# Patient Record
Sex: MALE | Race: WHITE
[De-identification: names, ages, dates, MRNs, and addresses within clinical notes are randomized per-mention and may not be internally consistent; named-entity substitution may affect disease eponyms.]

---

## 2017-05-22 ENCOUNTER — HOSPITAL ENCOUNTER (EMERGENCY)
Dept: HOSPITAL 79 - ER1 | Age: 67
Discharge: HOME | End: 2017-05-22
Payer: COMMERCIAL

## 2017-05-22 DIAGNOSIS — Z88.2: ICD-10-CM

## 2017-05-22 DIAGNOSIS — I48.91: ICD-10-CM

## 2017-05-22 DIAGNOSIS — R00.1: Primary | ICD-10-CM

## 2017-05-22 DIAGNOSIS — Z79.02: ICD-10-CM

## 2017-05-22 DIAGNOSIS — Z79.899: ICD-10-CM

## 2017-05-22 DIAGNOSIS — Z79.01: ICD-10-CM

## 2017-05-22 DIAGNOSIS — I25.2: ICD-10-CM

## 2017-05-22 LAB
BUN/CREATININE RATIO: 20 (ref 0–10)
HGB BLD-MCNC: 14.3 GM/DL (ref 14–17.5)
RED BLOOD COUNT: 4.53 M/UL (ref 4.2–5.5)
WHITE BLOOD COUNT: 7.4 K/UL (ref 4.5–11)

## 2021-03-11 ENCOUNTER — IMMUNIZATION (OUTPATIENT)
Dept: VACCINE CLINIC | Facility: HOSPITAL | Age: 71
End: 2021-03-11

## 2021-03-11 PROCEDURE — 91300 HC SARSCOV02 VAC 30MCG/0.3ML IM: CPT | Performed by: INTERNAL MEDICINE

## 2021-03-11 PROCEDURE — 0001A: CPT | Performed by: INTERNAL MEDICINE

## 2021-04-01 ENCOUNTER — IMMUNIZATION (OUTPATIENT)
Dept: VACCINE CLINIC | Facility: HOSPITAL | Age: 71
End: 2021-04-01

## 2021-04-01 PROCEDURE — 91300 HC SARSCOV02 VAC 30MCG/0.3ML IM: CPT | Performed by: INTERNAL MEDICINE

## 2021-04-01 PROCEDURE — 0002A: CPT | Performed by: INTERNAL MEDICINE

## 2021-05-21 ENCOUNTER — HOSPITAL ENCOUNTER (OUTPATIENT)
Dept: GENERAL RADIOLOGY | Facility: HOSPITAL | Age: 71
Discharge: HOME | End: 2021-05-21
Admitting: FAMILY MEDICINE

## 2021-05-21 ENCOUNTER — TRANSCRIBE ORDERS (OUTPATIENT)
Dept: LAB | Facility: HOSPITAL | Age: 71
End: 2021-05-21

## 2021-05-21 DIAGNOSIS — M25.571 RIGHT ANKLE PAIN, UNSPECIFIED CHRONICITY: ICD-10-CM

## 2021-05-21 DIAGNOSIS — M25.571 RIGHT ANKLE PAIN, UNSPECIFIED CHRONICITY: Primary | ICD-10-CM

## 2021-05-21 PROCEDURE — 73610 X-RAY EXAM OF ANKLE: CPT | Performed by: RADIOLOGY

## 2021-05-21 PROCEDURE — 73610 X-RAY EXAM OF ANKLE: CPT

## 2021-05-25 ENCOUNTER — HOSPITAL ENCOUNTER (OUTPATIENT)
Dept: GENERAL RADIOLOGY | Facility: HOSPITAL | Age: 71
Discharge: HOME | End: 2021-05-25
Admitting: FAMILY MEDICINE

## 2021-05-25 ENCOUNTER — TRANSCRIBE ORDERS (OUTPATIENT)
Dept: LAB | Facility: HOSPITAL | Age: 71
End: 2021-05-25

## 2021-05-25 DIAGNOSIS — M79.604 PAIN IN RIGHT LEG: ICD-10-CM

## 2021-05-25 DIAGNOSIS — M79.604 PAIN IN RIGHT LEG: Primary | ICD-10-CM

## 2021-05-25 PROCEDURE — 72110 X-RAY EXAM L-2 SPINE 4/>VWS: CPT | Performed by: RADIOLOGY

## 2021-05-25 PROCEDURE — 72110 X-RAY EXAM L-2 SPINE 4/>VWS: CPT

## 2022-11-08 ENCOUNTER — OFFICE VISIT (OUTPATIENT)
Dept: CARDIOLOGY | Facility: CLINIC | Age: 72
End: 2022-11-08

## 2022-11-08 VITALS
DIASTOLIC BLOOD PRESSURE: 74 MMHG | HEIGHT: 65 IN | BODY MASS INDEX: 25.02 KG/M2 | SYSTOLIC BLOOD PRESSURE: 123 MMHG | HEART RATE: 56 BPM | WEIGHT: 150.2 LBS | RESPIRATION RATE: 16 BRPM

## 2022-11-08 DIAGNOSIS — I25.10 CORONARY ARTERY DISEASE INVOLVING NATIVE CORONARY ARTERY OF NATIVE HEART WITHOUT ANGINA PECTORIS: Primary | ICD-10-CM

## 2022-11-08 DIAGNOSIS — E78.5 DYSLIPIDEMIA: ICD-10-CM

## 2022-11-08 DIAGNOSIS — F17.211 CIGARETTE NICOTINE DEPENDENCE IN REMISSION: ICD-10-CM

## 2022-11-08 DIAGNOSIS — R01.1 HEART MURMUR: ICD-10-CM

## 2022-11-08 DIAGNOSIS — I48.0 PAROXYSMAL ATRIAL FIBRILLATION: ICD-10-CM

## 2022-11-08 DIAGNOSIS — I10 ESSENTIAL HYPERTENSION: ICD-10-CM

## 2022-11-08 DIAGNOSIS — G47.33 OSA (OBSTRUCTIVE SLEEP APNEA): ICD-10-CM

## 2022-11-08 DIAGNOSIS — Z95.1 S/P CABG (CORONARY ARTERY BYPASS GRAFT): ICD-10-CM

## 2022-11-08 PROCEDURE — 99204 OFFICE O/P NEW MOD 45 MIN: CPT | Performed by: SPECIALIST

## 2022-11-08 PROCEDURE — 93000 ELECTROCARDIOGRAM COMPLETE: CPT | Performed by: SPECIALIST

## 2022-11-08 RX ORDER — TAMSULOSIN HYDROCHLORIDE 0.4 MG/1
1 CAPSULE ORAL DAILY
COMMUNITY

## 2022-11-08 RX ORDER — TRAZODONE HYDROCHLORIDE 50 MG/1
50 TABLET ORAL NIGHTLY
COMMUNITY
End: 2023-01-24

## 2022-11-08 RX ORDER — CLOPIDOGREL BISULFATE 75 MG/1
75 TABLET ORAL DAILY
COMMUNITY

## 2022-11-08 RX ORDER — HYDROXYZINE PAMOATE 25 MG/1
25 CAPSULE ORAL 2 TIMES DAILY
COMMUNITY

## 2022-11-08 RX ORDER — ATORVASTATIN CALCIUM 80 MG/1
80 TABLET, FILM COATED ORAL DAILY
COMMUNITY
End: 2023-01-24

## 2022-11-08 RX ORDER — ASPIRIN 81 MG/1
81 TABLET ORAL DAILY
COMMUNITY

## 2022-11-08 NOTE — PROGRESS NOTES
Subjective   Initial consultation, CAD, S/P CABG* 2  Valentin Ward is a 72 y.o. male who presents to day for Establish Care (Prior patient of Dr. Early), Coronary Artery Disease (S/p  x2V bypass, [facility] 03/2020), Med Management (List provided), and leg cramps (At night).    CHIEF COMPLIANT  Chief Complaint   Patient presents with   • Establish Care     Prior patient of Dr. Early   • Coronary Artery Disease     S/p  x2V bypass,  hosp 03/2020   • Med Management     List provided   • leg cramps     At night       Active Problems:  Problem List Items Addressed This Visit        Cardiac and Vasculature    Coronary artery disease involving native coronary artery of native heart without angina pectoris - Primary    Relevant Medications    clopidogrel (PLAVIX) 75 MG tablet    metoprolol tartrate (LOPRESSOR) 25 MG tablet    S/P CABG (coronary artery bypass graft)    Essential hypertension    Relevant Medications    metoprolol tartrate (LOPRESSOR) 25 MG tablet    Other Relevant Orders    Comprehensive Metabolic Panel    Dyslipidemia    Relevant Orders    Lipid Panel    Paroxysmal atrial fibrillation (HCC)    Relevant Medications    clopidogrel (PLAVIX) 75 MG tablet    metoprolol tartrate (LOPRESSOR) 25 MG tablet    Other Relevant Orders    Adult Transthoracic Echo Complete w/ Color, Spectral and Contrast if necessary per protocol    Cardiac Event Monitor    Heart murmur    Relevant Orders    Adult Transthoracic Echo Complete w/ Color, Spectral and Contrast if necessary per protocol       Sleep    MARIA (obstructive sleep apnea)    Relevant Orders    Ambulatory Referral to Pulmonology       Tobacco    Cigarette nicotine dependence in remission       HPI  HPI  Was found to have left main disease back in March 2020 where he underwent coronary artery with vascular stents x2 at [facility] with a LIMA graft to LAD and SVG graft to the obtuse marginal 2 after that he did not follow-up with cardiology apparently  postoperatively also he had atrial fibrillation and he was restarted on Xarelto he cannot recall that currently he is not taking that he is also has a history of sleep apnea but has not been using CPAP he was having some issues with some memory issues but this has improved since the surgery he is also to have frequent palpitations prior to the surgery with this is now almost never happens he is denying any chest pain or shortness of breath no edema he is quite active, he said his cholesterol has been good he has history of hypertension no diabetes he has no family history of heart disease he used to smoke for almost 30 years half pack per day and he quit about 8 years ago  PRIOR MEDS  Current Outpatient Medications on File Prior to Visit   Medication Sig Dispense Refill   • aspirin 81 MG EC tablet Take 1 tablet by mouth Daily.     • atorvastatin (LIPITOR) 80 MG tablet Take 1 tablet by mouth Daily.     • clopidogrel (PLAVIX) 75 MG tablet Take 1 tablet by mouth Daily.     • hydrOXYzine pamoate (VISTARIL) 25 MG capsule Take 1 capsule by mouth 2 (Two) Times a Day.     • metoprolol tartrate (LOPRESSOR) 25 MG tablet Take 1 tablet by mouth 2 (Two) Times a Day.     • tamsulosin (FLOMAX) 0.4 MG capsule 24 hr capsule Take 1 capsule by mouth Daily.     • traZODone (DESYREL) 50 MG tablet Take 1 tablet by mouth Every Night.       No current facility-administered medications on file prior to visit.       ALLERGIES  Patient has no known allergies.    HISTORY  Past Medical History:   Diagnosis Date   • Benign tumor of heart    • Coronary artery disease    • Deep vein thrombosis (HCC)    • Diabetes mellitus (HCC)    • Heart murmur    • Hyperlipidemia    • Hypertension    • Sleep apnea    • Stroke (HCC)        Social History     Socioeconomic History   • Marital status:    Tobacco Use   • Smoking status: Former     Packs/day: 0.50     Types: Cigarettes     Quit date:      Years since quittin.8   • Smokeless tobacco:  "Former     Types: Chew     Quit date: 1996   Substance and Sexual Activity   • Alcohol use: Yes     Comment: occas       History reviewed. No pertinent family history.    Review of Systems   Respiratory: Negative for apnea, cough, choking, chest tightness, shortness of breath, wheezing and stridor.    Cardiovascular: Negative for chest pain, palpitations and leg swelling.       Objective     VITALS: /74 (BP Location: Left arm)   Pulse 56   Resp 16   Ht 165.1 cm (65\")   Wt 68.1 kg (150 lb 3.2 oz)   BMI 24.99 kg/m²     LABS:   Lab Results (most recent)     None          IMAGING:   No Images in the past 120 days found..    EXAM:  Physical Exam  Constitutional:       Appearance: He is well-developed.   HENT:      Head: Normocephalic and atraumatic.   Eyes:      Pupils: Pupils are equal, round, and reactive to light.   Neck:      Thyroid: No thyromegaly.      Vascular: No JVD.   Cardiovascular:      Rate and Rhythm: Normal rate and regular rhythm.      Chest Wall: PMI is not displaced.      Pulses: Normal pulses.      Heart sounds: Normal heart sounds, S1 normal and S2 normal. No murmur heard.    No friction rub. No gallop.      Comments: Healed sternotomy scar  2/6 holosystolic  Murmur, heard best at the apex, radiates across axilla  Pulmonary:      Effort: Pulmonary effort is normal. No respiratory distress.      Breath sounds: Normal breath sounds. No stridor. No wheezing or rales.   Chest:      Chest wall: No tenderness.   Abdominal:      General: Bowel sounds are normal. There is no distension.      Palpations: Abdomen is soft. There is no mass.      Tenderness: There is no abdominal tenderness. There is no guarding or rebound.   Musculoskeletal:      Cervical back: Neck supple. No edema.   Skin:     General: Skin is warm and dry.      Coloration: Skin is not pale.      Findings: No erythema or rash.   Neurological:      Mental Status: He is alert and oriented to person, place, and time.      Cranial " Nerves: No cranial nerve deficit.      Coordination: Coordination normal.   Psychiatric:         Behavior: Behavior normal.         Procedure     ECG 12 Lead    Date/Time: 11/8/2022 10:58 AM  Performed by: Yanira Jacques MD  Authorized by: Yanira Jacques MD           EKG: Sinus bradycardia heart rate is 54 beats minute otherwise unremarkable EKG, no previous EKG for comparison       Assessment & Plan     Diagnoses and all orders for this visit:    1. Coronary artery disease involving native coronary artery of native heart without angina pectoris (Primary)    2. S/P CABG (coronary artery bypass graft)    3. Essential hypertension  -     Comprehensive Metabolic Panel; Future    4. Dyslipidemia  -     Lipid Panel; Future    5. MARIA (obstructive sleep apnea)  -     Ambulatory Referral to Pulmonology    6. Cigarette nicotine dependence in remission    7. Paroxysmal atrial fibrillation (HCC)  -     Adult Transthoracic Echo Complete w/ Color, Spectral and Contrast if necessary per protocol; Future  -     Cardiac Event Monitor; Future    8. Heart murmur  -     Adult Transthoracic Echo Complete w/ Color, Spectral and Contrast if necessary per protocol; Future    Other orders  -     ECG 12 Lead      1.  Patient was found to have left main disease back on March 2020 and underwent CABG x2 at the [facility] by Dr. Gomez where he had LIMA graft to LAD and SVG graft to the obtuse marginal 2 since then he has not follow-up with cardiologist he was found to have had atrial fibrillation postoperatively and he was discharged from  on aspirin and Plavix but no anticoagulants he is denying any further palpitations at all and he is active with no chest pain or other cardiac complaints, so we will get an echocardiogram to assess his cardiac function wall motion and valve morphology  2.  I am concerned about the issue of the atrial fibrillation he has a history of a stroke in the past he is not anticoagulated I am going to get an  event monitor for 30 days to assess for any evidence of atrial fibrillation if he does I am going to start him on Eliquis or one of the NOAC medications  3.  I will check his lipids and CMP he is already on a statin at 80 mg we will continue with the current dose  4.  His blood pressure is controlled we will continue current management  5.  He has sleep apnea has not been using the CPAP he said he is going to start using it I am going to refer him also to pulmonology for follow-up of his CPAP  6.  He quitted smoking 8 years ago    Return in about 3 months (around 2/8/2023).      Advance Care Planning   ACP discussion was held with the patient during this visit. Patient has an advance directive (not in EMR), copy requested.             MEDS ORDERED DURING VISIT:  No orders of the defined types were placed in this encounter.      As always, Bethany Hawk MD  I appreciate very much the opportunity to participate in the cardiovascular care of your patients. Please do not hesitate to call me with any questions with regards to Valentin Ward evaluation and management.         This document has been electronically signed by Yanira Jacques MD  November 8, 2022 11:42 EST

## 2022-12-13 ENCOUNTER — TREATMENT (OUTPATIENT)
Dept: CARDIOLOGY | Facility: CLINIC | Age: 72
End: 2022-12-13

## 2022-12-13 DIAGNOSIS — I48.0 PAROXYSMAL ATRIAL FIBRILLATION: ICD-10-CM

## 2022-12-13 PROCEDURE — 93228 REMOTE 30 DAY ECG REV/REPORT: CPT | Performed by: SPECIALIST

## 2023-01-24 ENCOUNTER — OFFICE VISIT (OUTPATIENT)
Dept: PULMONOLOGY | Facility: CLINIC | Age: 73
End: 2023-01-24
Payer: MEDICARE

## 2023-01-24 VITALS
WEIGHT: 145 LBS | OXYGEN SATURATION: 98 % | BODY MASS INDEX: 24.16 KG/M2 | HEART RATE: 71 BPM | TEMPERATURE: 97.7 F | SYSTOLIC BLOOD PRESSURE: 115 MMHG | DIASTOLIC BLOOD PRESSURE: 62 MMHG | HEIGHT: 65 IN

## 2023-01-24 DIAGNOSIS — G47.33 OSA (OBSTRUCTIVE SLEEP APNEA): Primary | ICD-10-CM

## 2023-01-24 PROCEDURE — 99203 OFFICE O/P NEW LOW 30 MIN: CPT | Performed by: NURSE PRACTITIONER

## 2023-01-24 NOTE — PROGRESS NOTES
"Chief Complaint  Sleep Apnea    Subjective        Valentin Ward presents to [facility]ICINE  History of Present Illness     Mr. Bah is a 72 year old male with a medical history significant for CAD, DVT, diabetes, hyperlipidemia, hypertension, stroke and sleep apnea.    He presents today for evaluation of sleep apnea.  He tells me that he did a sleep study about 5 years ago.  He states that at this time he started using a cpap but was unable to tolerate it.  He states that he feels that his sleep apnea is getting worse and that he is waking up a lot at night.  He states that he would like to start using a cpap again.        Objective   Vital Signs:  /62   Pulse 71   Temp 97.7 °F (36.5 °C) (Temporal)   Ht 165.1 cm (65\")   Wt 65.8 kg (145 lb)   SpO2 98%   BMI 24.13 kg/m²   Estimated body mass index is 24.13 kg/m² as calculated from the following:    Height as of this encounter: 165.1 cm (65\").    Weight as of this encounter: 65.8 kg (145 lb).       BMI is within normal parameters. No other follow-up for BMI required.      Physical Exam     GENERAL APPEARANCE: Well developed, well nourished, alert and cooperative, and appears to be in no acute distress.    HEAD: normocephalic. Atraumatic.    EYES: PERRL, EOMI. Vision is grossly intact.    THROAT: Oral cavity and pharynx normal. No inflammation, swelling, exudate, or lesions.     NECK: Neck supple.  No thyromegaly.    CARDIAC: Normal S1 and S2. No S3, S4 or murmurs. Rhythm is regular.     RESPIRATORY:Bilateral air entry positive. Bilateral diminished breath sounds. No wheezing, crackles or rhonchi noted.    GI: Positive bowel sounds. Soft, nondistended, nontender.     MUSCULOSKELETAL: No significant deformity or joint abnormality. No edema. Peripheral pulses intact. No varicosities.    NEUROLOGICAL: Strength and sensation symmetric and intact throughout.     PSYCHIATRIC: The mental examination revealed " the patient was oriented to person, place, and time.     Result Review :  The following data was reviewed by: LEATHA Hobson on 01/24/2023:             Assessment and Plan   Diagnoses and all orders for this visit:    1. MARIA (obstructive sleep apnea) (Primary)      Has this patient had a previous sleep study? yes About 5 years ago   Is the patient on current Pap therapy?  no   Has the patient had observed apnea during sleep?yes   Do experience excessive daytime sleepiness: no  Do experience habitual snoring, gasping/choking episodes associated with awakenings?yes   Do you have unexplained hypertension?yes   Do you have Nonrestorative sleep? sometimes             Will obtain sleep study results from [facility]. If study results are not available, will order a sleep study.   The patient was extensively educated on the consequences of untreated obstructive sleep apnea namely cardiovascular/metabolic disorder, neurocognitive deficit, daytime sleepiness, motor vehicle accidents, depression, mood disorders and reduced quality of life.  At the end of conversation, the patient voices understanding of the disease process and treatment modality.  Patient also understands the risk of untreated obstructive sleep apnea and benefit benefits of the treatment.    Counseling time was greater than 10 minutes.              Follow Up   Return in about 3 months (around 4/24/2023).  Patient was given instructions and counseling regarding his condition or for health maintenance advice. Please see specific information pulled into the AVS if appropriate.

## 2023-01-25 ENCOUNTER — TELEPHONE (OUTPATIENT)
Dept: PULMONOLOGY | Facility: CLINIC | Age: 73
End: 2023-01-25
Payer: MEDICARE

## 2023-01-25 DIAGNOSIS — G47.33 OSA (OBSTRUCTIVE SLEEP APNEA): Primary | ICD-10-CM

## 2023-01-25 NOTE — TELEPHONE ENCOUNTER
----- Message from LEATHA Hobson sent at 1/25/2023 11:35 AM EST -----  Will you let him know that he does have sleep apnea.  I am starting him on an autopap.  ----- Message -----  From: Jose Abad Incoming  Sent: 1/24/2023   4:58 PM EST  To: LEATHA Hobson

## 2023-02-09 ENCOUNTER — LAB (OUTPATIENT)
Dept: LAB | Facility: HOSPITAL | Age: 73
End: 2023-02-09
Payer: MEDICARE

## 2023-02-09 DIAGNOSIS — I48.0 PAROXYSMAL ATRIAL FIBRILLATION: ICD-10-CM

## 2023-02-09 DIAGNOSIS — I10 ESSENTIAL HYPERTENSION: ICD-10-CM

## 2023-02-09 DIAGNOSIS — E78.5 DYSLIPIDEMIA: ICD-10-CM

## 2023-02-09 PROCEDURE — 84443 ASSAY THYROID STIM HORMONE: CPT

## 2023-02-09 PROCEDURE — 80061 LIPID PANEL: CPT

## 2023-02-09 PROCEDURE — 80053 COMPREHEN METABOLIC PANEL: CPT

## 2023-02-09 PROCEDURE — 36415 COLL VENOUS BLD VENIPUNCTURE: CPT

## 2023-02-10 LAB
ALBUMIN SERPL-MCNC: 4.3 G/DL (ref 3.5–5.2)
ALBUMIN/GLOB SERPL: 2 G/DL
ALP SERPL-CCNC: 94 U/L (ref 39–117)
ALT SERPL W P-5'-P-CCNC: 17 U/L (ref 1–41)
ANION GAP SERPL CALCULATED.3IONS-SCNC: 10 MMOL/L (ref 5–15)
AST SERPL-CCNC: 19 U/L (ref 1–40)
BILIRUB SERPL-MCNC: 0.4 MG/DL (ref 0–1.2)
BUN SERPL-MCNC: 22 MG/DL (ref 8–23)
BUN/CREAT SERPL: 20 (ref 7–25)
CALCIUM SPEC-SCNC: 9.1 MG/DL (ref 8.6–10.5)
CHLORIDE SERPL-SCNC: 106 MMOL/L (ref 98–107)
CHOLEST SERPL-MCNC: 106 MG/DL (ref 0–200)
CO2 SERPL-SCNC: 24 MMOL/L (ref 22–29)
CREAT SERPL-MCNC: 1.1 MG/DL (ref 0.76–1.27)
EGFRCR SERPLBLD CKD-EPI 2021: 72.2 ML/MIN/1.73
GLOBULIN UR ELPH-MCNC: 2.1 GM/DL
GLUCOSE SERPL-MCNC: 110 MG/DL (ref 65–99)
HDLC SERPL-MCNC: 35 MG/DL (ref 40–60)
LDLC SERPL CALC-MCNC: 58 MG/DL (ref 0–100)
LDLC/HDLC SERPL: 1.7 {RATIO}
POTASSIUM SERPL-SCNC: 4.3 MMOL/L (ref 3.5–5.2)
PROT SERPL-MCNC: 6.4 G/DL (ref 6–8.5)
SODIUM SERPL-SCNC: 140 MMOL/L (ref 136–145)
TRIGL SERPL-MCNC: 57 MG/DL (ref 0–150)
TSH SERPL DL<=0.05 MIU/L-ACNC: 2.41 UIU/ML (ref 0.27–4.2)
VLDLC SERPL-MCNC: 13 MG/DL (ref 5–40)

## 2023-02-16 ENCOUNTER — OFFICE VISIT (OUTPATIENT)
Dept: CARDIOLOGY | Facility: CLINIC | Age: 73
End: 2023-02-16
Payer: MEDICARE

## 2023-02-16 VITALS
BODY MASS INDEX: 25.02 KG/M2 | DIASTOLIC BLOOD PRESSURE: 73 MMHG | OXYGEN SATURATION: 99 % | HEART RATE: 65 BPM | HEIGHT: 65 IN | RESPIRATION RATE: 16 BRPM | SYSTOLIC BLOOD PRESSURE: 115 MMHG | WEIGHT: 150.2 LBS

## 2023-02-16 DIAGNOSIS — I10 ESSENTIAL HYPERTENSION: ICD-10-CM

## 2023-02-16 DIAGNOSIS — I48.0 PAROXYSMAL ATRIAL FIBRILLATION: Primary | ICD-10-CM

## 2023-02-16 DIAGNOSIS — G47.33 OSA (OBSTRUCTIVE SLEEP APNEA): ICD-10-CM

## 2023-02-16 DIAGNOSIS — E78.5 DYSLIPIDEMIA: ICD-10-CM

## 2023-02-16 DIAGNOSIS — Z95.1 S/P CABG (CORONARY ARTERY BYPASS GRAFT): ICD-10-CM

## 2023-02-16 PROCEDURE — 99214 OFFICE O/P EST MOD 30 MIN: CPT | Performed by: NURSE PRACTITIONER

## 2023-02-16 RX ORDER — ATORVASTATIN CALCIUM 80 MG/1
80 TABLET, FILM COATED ORAL DAILY
COMMUNITY

## 2023-02-16 NOTE — PROGRESS NOTES
[facility]             [facility] LEATHA Clark Jackie D, MD  Valentin Ward  1950  02/16/2023    Patient Active Problem List   Diagnosis   • Coronary artery disease involving native coronary artery of native heart without angina pectoris   • S/P CABG (coronary artery bypass graft)   • Essential hypertension   • Dyslipidemia   • MARIA (obstructive sleep apnea)   • Cigarette nicotine dependence in remission   • Paroxysmal atrial fibrillation (HCC)   • Heart murmur       Dear Bethany Hawk MD:    Subjective     Chief Complaint   Patient presents with   • Follow-up     3 mos, event monitor and labs  Echo not done yet   • Med Management     List provided       HPI:     This is a 72 y.o. male with known past medical history of coronary artery diseae s/p CABG 2V in 2020 (LIMA graft LAD and SVG graft to the obtuse marginal 2), essential hypertension, paroxysmal atrial fibrillation post op, hyperlipidemia and obstructive sleep apnea.    Valentin Ward presents today for routine cardiology follow up.  Patient states he has been doing well since his last visit.  He has been working out at the gym with no issues.  Denies any chest pain, shortness of breath or palpitations.  Blood pressure has been well controlled.  He did undergo cardiac event monitor which showed sinus tachycardia with sinus bradycardia in the 40s with no arrhythmias or atrial fibrillation noted.  Has yet to get his echocardiogram done however this is scheduled.  He did have a home sleep study which was positive for sleep apnea and he was sent to pulmonology and is now wearing CPAP and compliant.    • Diagnostic Testing  1. Cardiac event monitor 12/2022: Sinus tachycardia with sinus bradycardia in the 40s with no arrhythmias                                                  All other systems were reviewed and were negative.    Patient Active Problem List   Diagnosis   • Coronary artery disease  involving native coronary artery of native heart without angina pectoris   • S/P CABG (coronary artery bypass graft)   • Essential hypertension   • Dyslipidemia   • MARIA (obstructive sleep apnea)   • Cigarette nicotine dependence in remission   • Paroxysmal atrial fibrillation (HCC)   • Heart murmur       family history is not on file.     reports that he quit smoking about 12 years ago. His smoking use included cigarettes. He smoked an average of .5 packs per day. He quit smokeless tobacco use about 27 years ago.  His smokeless tobacco use included chew. He reports current alcohol use. He reports that he does not use drugs.    No Known Allergies      Current Outpatient Medications:   •  aspirin 81 MG EC tablet, Take 1 tablet by mouth Daily., Disp: , Rfl:   •  atorvastatin (LIPITOR) 80 MG tablet, Take 80 mg by mouth Daily., Disp: , Rfl:   •  clopidogrel (PLAVIX) 75 MG tablet, Take 1 tablet by mouth Daily., Disp: , Rfl:   •  hydrOXYzine pamoate (VISTARIL) 25 MG capsule, Take 1 capsule by mouth 2 (Two) Times a Day., Disp: , Rfl:   •  metFORMIN (GLUCOPHAGE) 500 MG tablet, TAKE 1 TABLET TWICE A DAY BY ORAL ROUTE FOR 90 DAYS., Disp: , Rfl:   •  metoprolol tartrate (LOPRESSOR) 25 MG tablet, Take 1 tablet by mouth 2 (Two) Times a Day., Disp: , Rfl:   •  tamsulosin (FLOMAX) 0.4 MG capsule 24 hr capsule, Take 1 capsule by mouth Daily., Disp: , Rfl:   •  apixaban (ELIQUIS) 5 MG tablet tablet, Take 1 tablet by mouth 2 (Two) Times a Day., Disp: 60 tablet, Rfl: 5      Physical Exam:  I have reviewed the patient's current vital signs as documented in the patient's EMR.   Vitals:    02/16/23 0925   BP: 115/73   Pulse: 65   Resp: 16   SpO2: 99%     Body mass index is 24.99 kg/m².       02/16/23 0925   Weight: 68.1 kg (150 lb 3.2 oz)      Physical Exam  Constitutional:       General: He is not in acute distress.     Appearance: Normal appearance. He is well-developed.   HENT:      Head: Normocephalic and atraumatic.       Mouth/Throat:      Mouth: Mucous membranes are moist.   Eyes:      Extraocular Movements: Extraocular movements intact.      Pupils: Pupils are equal, round, and reactive to light.   Neck:      Vascular: No JVD.   Cardiovascular:      Rate and Rhythm: Normal rate and regular rhythm.      Heart sounds: Normal heart sounds. No murmur heard.    No S3 or S4 sounds.   Pulmonary:      Effort: Pulmonary effort is normal. No respiratory distress.      Breath sounds: Normal breath sounds. No wheezing.   Abdominal:      General: Bowel sounds are normal. There is no distension.      Palpations: Abdomen is soft. There is no hepatomegaly.      Tenderness: There is no abdominal tenderness.   Musculoskeletal:         General: Normal range of motion.      Cervical back: Normal range of motion and neck supple.   Skin:     General: Skin is warm and dry.      Coloration: Skin is not jaundiced or pale.   Neurological:      General: No focal deficit present.      Mental Status: He is alert and oriented to person, place, and time. Mental status is at baseline.   Psychiatric:         Mood and Affect: Mood normal.         Behavior: Behavior normal.         Thought Content: Thought content normal.         Judgment: Judgment normal.            DATA REVIEWED:     TTE/NANCIE:      Laboratory evaluations:    Lab Results   Component Value Date    GLUCOSE 110 (H) 02/09/2023    BUN 22 02/09/2023    CREATININE 1.10 02/09/2023    BCR 20.0 02/09/2023    K 4.3 02/09/2023    CO2 24.0 02/09/2023    CALCIUM 9.1 02/09/2023    ALBUMIN 4.3 02/09/2023    AST 19 02/09/2023    ALT 17 02/09/2023     No results found for: WBC, HGB, HCT, MCV, PLT  Lab Results   Component Value Date    CHOL 106 02/09/2023    TRIG 57 02/09/2023    HDL 35 (L) 02/09/2023    LDL 58 02/09/2023     Lab Results   Component Value Date    TSH 2.410 02/09/2023     No results found for: HGBA1C  Lab Results   Component Value Date    ALT 17 02/09/2023     No results found for: HGBA1C  Lab Results    Component Value Date    CREATININE 1.10 02/09/2023     No results found for: IRON, TIBC, FERRITIN  No results found for: INR, PROTIME        --------------------------------------------------------------------------------------------------------------------------    ASSESSMENT/PLAN:      Diagnosis Plan   1. Paroxysmal atrial fibrillation (HCC)  apixaban (ELIQUIS) 5 MG tablet tablet      2. MARIA (obstructive sleep apnea)        3. Essential hypertension        4. Dyslipidemia        5. S/P CABG (coronary artery bypass graft)            1. Paroxysmal atrial fibrillation  • Patient has a history of postop atrial fibrillation after having CABG with no reported recurrence.  Cardiac event monitor was performed which showed normal sinus rhythm with no incidence of atrial fibrillation.  His [id number] score is 5 therefore he is at high risk for stroke.  I discussed with the patient regarding starting anticoagulation versus holding off on it.  Patient has decided to proceed with anticoagulation given his history of stroke with difficult family history of multiple strokes.  He has been on Xarelto in the past and states it was very expensive and he was unable to afford it.  After further discussion I have advised him to start Eliquis if affordable and to discontinue aspirin and continue Plavix.  If Eliquis is not affordable he is to continue with Plavix and aspirin alone.  He verbalized understanding.    2. ASCVD  3. Dyslipidemia  • Denies any chest pain.  Has been working out daily.  Echocardiogram was scheduled but has not yet been performed.  Follow-up on results.  Continue with Lipitor, Plavix, metoprolol.  • Recent lipid panel showed LDL at goal.    4.  Obstructive sleep apnea  · Patient was referred to pulmonology and set up with CPAP.  He reports compliance.    5.  Essential hypertension  · Blood pressure controlled.  Recent CMP showed normal kidney function.  Continue metoprolol.      This document has been  @Electronically signed by LEATHA Whitt, 02/16/23, 8:45 AM EST.       Dictated Utilizing Dragon Dictation: Part of this note may be an electronic transcription/translation of spoken language to printed text using the Dragon Dictation System.    Follow-up appointment and medication changes provided in hand delivered After Visit Summary as well as reviewed in the room.

## 2023-03-06 ENCOUNTER — HOSPITAL ENCOUNTER (OUTPATIENT)
Dept: CARDIOLOGY | Facility: HOSPITAL | Age: 73
Discharge: HOME | End: 2023-03-06
Admitting: SPECIALIST
Payer: MEDICARE

## 2023-03-06 DIAGNOSIS — I48.0 PAROXYSMAL ATRIAL FIBRILLATION: ICD-10-CM

## 2023-03-06 DIAGNOSIS — R01.1 HEART MURMUR: ICD-10-CM

## 2023-03-06 LAB
BH CV ECHO MEAS - AO MAX PG: 10.9 MMHG
BH CV ECHO MEAS - AO MEAN PG: 7 MMHG
BH CV ECHO MEAS - AO ROOT DIAM: 2.9 CM
BH CV ECHO MEAS - AO V2 MAX: 165 CM/SEC
BH CV ECHO MEAS - AO V2 VTI: 32.9 CM
BH CV ECHO MEAS - AVA(I,D): 1.64 CM2
BH CV ECHO MEAS - EDV(CUBED): 68.9 ML
BH CV ECHO MEAS - EDV(MOD-SP4): 53.8 ML
BH CV ECHO MEAS - EF(MOD-SP4): 59.3 %
BH CV ECHO MEAS - ESV(CUBED): 39.3 ML
BH CV ECHO MEAS - ESV(MOD-SP4): 21.9 ML
BH CV ECHO MEAS - FS: 17.1 %
BH CV ECHO MEAS - IVS/LVPW: 1.67 CM
BH CV ECHO MEAS - IVSD: 1.3 CM
BH CV ECHO MEAS - LA DIMENSION: 3.7 CM
BH CV ECHO MEAS - LAT PEAK E' VEL: 10.6 CM/SEC
BH CV ECHO MEAS - LV DIASTOLIC VOL/BSA (35-75): 30.7 CM2
BH CV ECHO MEAS - LV MASS(C)D: 171.7 GRAMS
BH CV ECHO MEAS - LV MAX PG: 4.8 MMHG
BH CV ECHO MEAS - LV MEAN PG: 2 MMHG
BH CV ECHO MEAS - LV SYSTOLIC VOL/BSA (12-30): 12.5 CM2
BH CV ECHO MEAS - LV V1 MAX: 109 CM/SEC
BH CV ECHO MEAS - LV V1 VTI: 23.8 CM
BH CV ECHO MEAS - LVIDD: 4.1 CM
BH CV ECHO MEAS - LVIDS: 3.4 CM
BH CV ECHO MEAS - LVOT AREA: 2.27 CM2
BH CV ECHO MEAS - LVOT DIAM: 1.7 CM
BH CV ECHO MEAS - LVPWD: 0.9 CM
BH CV ECHO MEAS - MED PEAK E' VEL: 9.9 CM/SEC
BH CV ECHO MEAS - MV A MAX VEL: 111 CM/SEC
BH CV ECHO MEAS - MV E MAX VEL: 93.6 CM/SEC
BH CV ECHO MEAS - MV E/A: 0.84
BH CV ECHO MEAS - PA ACC TIME: 0.08 SEC
BH CV ECHO MEAS - PA PR(ACCEL): 44.4 MMHG
BH CV ECHO MEAS - RAP SYSTOLE: 10 MMHG
BH CV ECHO MEAS - RVSP: 39.8 MMHG
BH CV ECHO MEAS - SI(MOD-SP4): 18.2 ML/M2
BH CV ECHO MEAS - SV(LVOT): 54 ML
BH CV ECHO MEAS - SV(MOD-SP4): 31.9 ML
BH CV ECHO MEAS - TAPSE (>1.6): 1.64 CM
BH CV ECHO MEAS - TR MAX PG: 29.8 MMHG
BH CV ECHO MEAS - TR MAX VEL: 273 CM/SEC
BH CV ECHO MEASUREMENTS AVERAGE E/E' RATIO: 9.13
LEFT ATRIUM VOLUME INDEX: 34.2 ML/M2
MAXIMAL PREDICTED HEART RATE: 150 BPM
STRESS TARGET HR: 128 BPM

## 2023-03-06 PROCEDURE — 93306 TTE W/DOPPLER COMPLETE: CPT

## 2023-03-06 PROCEDURE — 93306 TTE W/DOPPLER COMPLETE: CPT | Performed by: SPECIALIST

## 2023-04-27 ENCOUNTER — OFFICE VISIT (OUTPATIENT)
Dept: PULMONOLOGY | Facility: CLINIC | Age: 73
End: 2023-04-27
Payer: MEDICARE

## 2023-04-27 VITALS
OXYGEN SATURATION: 98 % | BODY MASS INDEX: 24.16 KG/M2 | DIASTOLIC BLOOD PRESSURE: 72 MMHG | TEMPERATURE: 97.3 F | HEART RATE: 66 BPM | WEIGHT: 145 LBS | HEIGHT: 65 IN | SYSTOLIC BLOOD PRESSURE: 122 MMHG

## 2023-04-27 DIAGNOSIS — G47.33 OSA (OBSTRUCTIVE SLEEP APNEA): Primary | ICD-10-CM

## 2023-04-27 PROCEDURE — 1159F MED LIST DOCD IN RCRD: CPT | Performed by: NURSE PRACTITIONER

## 2023-04-27 PROCEDURE — 3074F SYST BP LT 130 MM HG: CPT | Performed by: NURSE PRACTITIONER

## 2023-04-27 PROCEDURE — 99213 OFFICE O/P EST LOW 20 MIN: CPT | Performed by: NURSE PRACTITIONER

## 2023-04-27 PROCEDURE — 1160F RVW MEDS BY RX/DR IN RCRD: CPT | Performed by: NURSE PRACTITIONER

## 2023-04-27 PROCEDURE — 3078F DIAST BP <80 MM HG: CPT | Performed by: NURSE PRACTITIONER

## 2023-04-27 RX ORDER — PHENYLEPHRINE HCL 10 MG
TABLET ORAL
COMMUNITY

## 2023-04-27 NOTE — PROGRESS NOTES
"Chief Complaint  Sleep Apnea    Subjective        Valentin Ward presents to [facility] PULMONARY & CRITICAL CARE MEDICINE  History of Present Illness     Mr. Ward is a 72 year old male with a medical history significant for CAD, DVT, diabetes, hyperlipidemia, hypertension, sleep apnea and stroke.    He presents today for follow up on sleep apnea.  He states that he was set up with an autopap to use nightly but was unable to tolerate it.  He does tell me that he has been taking melatonin and this is helping to sleep better. He states that he has more energy and feels great when he wakes up.      Objective   Vital Signs:  /72   Pulse 66   Temp 97.3 °F (36.3 °C) (Temporal)   Ht 165.1 cm (65\")   Wt 65.8 kg (145 lb)   SpO2 98%   BMI 24.13 kg/m²   Estimated body mass index is 24.13 kg/m² as calculated from the following:    Height as of this encounter: 165.1 cm (65\").    Weight as of this encounter: 65.8 kg (145 lb).       BMI is within normal parameters. No other follow-up for BMI required.      Physical Exam     GENERAL APPEARANCE: Well developed, well nourished, alert and cooperative, and appears to be in no acute distress.    HEAD: normocephalic. Atraumatic.    EYES: PERRL, EOMI. Vision is grossly intact.    THROAT: Oral cavity and pharynx normal. No inflammation, swelling, exudate, or lesions.     NECK: Neck supple.  No thyromegaly.    CARDIAC: Normal S1 and S2. No S3, S4 or murmurs. Rhythm is regular.     RESPIRATORY:Bilateral air entry positive. Bilateral diminished breath sounds. No wheezing, crackles or rhonchi noted.    GI: Positive bowel sounds. Soft, nondistended, nontender.     MUSCULOSKELETAL: No significant deformity or joint abnormality. No edema. Peripheral pulses intact. No varicosities.    NEUROLOGICAL: Strength and sensation symmetric and intact throughout.     PSYCHIATRIC: The mental examination revealed the patient was oriented to person, place, and time.     Result " Review :  The following data was reviewed by: LEATHA Hobson on 04/27/2023:  Common labs        2/9/2023    09:46   Common Labs   Glucose 110     BUN 22     Creatinine 1.10     Sodium 140     Potassium 4.3     Chloride 106     Calcium 9.1     Albumin 4.3     Total Bilirubin 0.4     Alkaline Phosphatase 94     AST (SGOT) 19     ALT (SGPT) 17     Total Cholesterol 106     Triglycerides 57     HDL Cholesterol 35     LDL Cholesterol  58                    Assessment and Plan   Diagnoses and all orders for this visit:    1. MARIA (obstructive sleep apnea) (Primary)  -     Miscellaneous DME           He was unable to tolerate autopap.  AHI was noted to be 5.6, which is consistent with mild sleep apnea.    Discussed other option for treatment of mild sleep apnea.    He is currently taking melatonin to help him sleep.     Patient was educated on positive airway pressure treatment.  As per CMS guidelines, more than 4 hours on 70% of observed nights is considered adherence. Patient was strongly encouraged to use CPAP as much as possible during sleep as more CPAP use is equal to more benefit. Use of heated humidification in positive airway pressure treatment to improve the adherence to the device.  In case of claustrophobia, we will provide the patient cognitive behavioral therapy and desensitization. Oral appliances use will be discussed with the patient in case of mild to moderate sleep apnea or if the patient with severe disease fail positive airway pressure treatment.       The patient was extensively educated on the consequences of untreated obstructive sleep apnea namely cardiovascular/metabolic disorder, neurocognitive deficit, daytime sleepiness, motor vehicle accidents, depression, mood disorders and reduced quality of life.  At the end of conversation, the patient voices understanding of the disease process and treatment modality.  Patient also understands the risk of untreated obstructive sleep apnea and  benefit benefits of the treatment.    Counseling time was greater than 10 minutes.          Follow Up   Return if symptoms worsen or fail to improve.  Patient was given instructions and counseling regarding his condition or for health maintenance advice. Please see specific information pulled into the AVS if appropriate.

## 2023-05-19 ENCOUNTER — OFFICE VISIT (OUTPATIENT)
Dept: CARDIOLOGY | Facility: CLINIC | Age: 73
End: 2023-05-19
Payer: MEDICARE

## 2023-05-19 VITALS
HEART RATE: 67 BPM | HEIGHT: 65 IN | SYSTOLIC BLOOD PRESSURE: 119 MMHG | BODY MASS INDEX: 25.26 KG/M2 | DIASTOLIC BLOOD PRESSURE: 77 MMHG | OXYGEN SATURATION: 98 % | WEIGHT: 151.6 LBS

## 2023-05-19 DIAGNOSIS — G47.33 OSA (OBSTRUCTIVE SLEEP APNEA): ICD-10-CM

## 2023-05-19 DIAGNOSIS — I25.10 CORONARY ARTERY DISEASE INVOLVING NATIVE CORONARY ARTERY OF NATIVE HEART WITHOUT ANGINA PECTORIS: Primary | ICD-10-CM

## 2023-05-19 DIAGNOSIS — I10 ESSENTIAL HYPERTENSION: ICD-10-CM

## 2023-05-19 DIAGNOSIS — I48.0 PAROXYSMAL ATRIAL FIBRILLATION: ICD-10-CM

## 2023-05-19 DIAGNOSIS — Z95.1 S/P CABG (CORONARY ARTERY BYPASS GRAFT): ICD-10-CM

## 2023-05-19 NOTE — PROGRESS NOTES
[facility]             [facility] LEATHA Clark Jackie D, MD  Valentin Ward  1950  05/19/2023    Patient Active Problem List   Diagnosis   • Coronary artery disease involving native coronary artery of native heart without angina pectoris   • S/P CABG (coronary artery bypass graft)   • Essential hypertension   • Dyslipidemia   • MARIA (obstructive sleep apnea)   • Cigarette nicotine dependence in remission   • Paroxysmal atrial fibrillation   • Heart murmur       Dear Bethany Hawk MD:    Subjective     Chief Complaint   Patient presents with   • Follow-up     ROUTINE       HPI:     This is a 72 y.o. male with known past medical history of coronary artery diseae s/p CABG 2V in 2020 (LIMA graft LAD and SVG graft to the obtuse marginal 2), essential hypertension, paroxysmal atrial fibrillation post op, hyperlipidemia and obstructive sleep apnea.     Valentin Ward presents today for routine cardiology follow up.  Patient states he has been doing well since his last visit.  Denies any chest pain, shortness of breath or palpitations.  At his last visit he was placed on Eliquis given his postop atrial fibrillation with multiple strokes in his family.  Patient states he went to  his Eliquis but the pharmacy was concerned about him being on aspirin and Plavix therefore he did not take the Eliquis.  Blood pressure has been well controlled.  Recent lab work stable.    • Diagnostic Testing  1. Cardiac event monitor 12/2022: Sinus tachycardia with sinus bradycardia in the 40s with no arrhythmias       2. Echocardiogram 3/2023: EF 66 to 70% mild aortic valve stenosis     All other systems were reviewed and were negative.    Patient Active Problem List   Diagnosis   • Coronary artery disease involving native coronary artery of native heart without angina pectoris   • S/P CABG (coronary artery bypass graft)   • Essential hypertension   • Dyslipidemia   • MARIA  (obstructive sleep apnea)   • Cigarette nicotine dependence in remission   • Paroxysmal atrial fibrillation   • Heart murmur       family history is not on file.     reports that he quit smoking about 12 years ago. His smoking use included cigarettes. He smoked an average of .5 packs per day. He has been exposed to tobacco smoke. He quit smokeless tobacco use about 27 years ago.  His smokeless tobacco use included chew. He reports current alcohol use. He reports that he does not use drugs.    No Known Allergies      Current Outpatient Medications:   •  apixaban (ELIQUIS) 5 MG tablet tablet, Take 1 tablet by mouth 2 (Two) Times a Day., Disp: 60 tablet, Rfl: 5  •  atorvastatin (LIPITOR) 80 MG tablet, Take 1 tablet by mouth Daily., Disp: , Rfl:   •  clopidogrel (PLAVIX) 75 MG tablet, Take 1 tablet by mouth Daily., Disp: , Rfl:   •  hydrOXYzine pamoate (VISTARIL) 25 MG capsule, Take 1 capsule by mouth 2 (Two) Times a Day., Disp: , Rfl:   •  Melatonin-Pyridoxine ER 10-10 MG tablet controlled-release, Take  by mouth., Disp: , Rfl:   •  metFORMIN (GLUCOPHAGE) 500 MG tablet, TAKE 1 TABLET TWICE A DAY BY ORAL ROUTE FOR 90 DAYS., Disp: , Rfl:   •  metoprolol tartrate (LOPRESSOR) 25 MG tablet, Take 1 tablet by mouth 2 (Two) Times a Day., Disp: , Rfl:   •  tamsulosin (FLOMAX) 0.4 MG capsule 24 hr capsule, Take 1 capsule by mouth Daily., Disp: , Rfl:       Physical Exam:  I have reviewed the patient's current vital signs as documented in the patient's EMR.   Vitals:    05/19/23 0843   BP: 119/77   Pulse: 67   SpO2: 98%     Body mass index is 25.23 kg/m².       05/19/23  0843   Weight: 68.8 kg (151 lb 9.6 oz)      Physical Exam  Constitutional:       General: He is not in acute distress.     Appearance: Normal appearance. He is well-developed.   HENT:      Head: Normocephalic and atraumatic.      Mouth/Throat:      Mouth: Mucous membranes are moist.   Eyes:      Extraocular Movements: Extraocular movements intact.      Pupils:  Pupils are equal, round, and reactive to light.   Neck:      Vascular: No JVD.   Cardiovascular:      Rate and Rhythm: Normal rate and regular rhythm.      Heart sounds: Normal heart sounds. No murmur heard.    No S3 or S4 sounds.   Pulmonary:      Effort: Pulmonary effort is normal. No respiratory distress.      Breath sounds: Normal breath sounds. No wheezing.   Abdominal:      General: Bowel sounds are normal. There is no distension.      Palpations: Abdomen is soft. There is no hepatomegaly.      Tenderness: There is no abdominal tenderness.   Musculoskeletal:         General: Normal range of motion.      Cervical back: Normal range of motion and neck supple.   Skin:     General: Skin is warm and dry.      Coloration: Skin is not jaundiced or pale.   Neurological:      General: No focal deficit present.      Mental Status: He is alert and oriented to person, place, and time. Mental status is at baseline.   Psychiatric:         Mood and Affect: Mood normal.         Behavior: Behavior normal.         Thought Content: Thought content normal.         Judgment: Judgment normal.            DATA REVIEWED:     TTE/NANCIE:  Results for orders placed during the hospital encounter of 03/06/23    Adult Transthoracic Echo Complete w/ Color, Spectral and Contrast if necessary per protocol    Interpretation Summary  •  Left ventricular systolic function is normal. Left ventricular ejection fraction appears to be 66 - 70%.  •  Left ventricular wall thickness is consistent with hypertrophy. Sigmoid-shaped ventricular septum is present.  •  Left ventricular diastolic function is consistent with (grade I) impaired relaxation.  •  Mild aortic valve stenosis is present.  •  Estimated right ventricular systolic pressure from tricuspid regurgitation is mildly elevated (35-45 mmHg).      Laboratory evaluations:    Lab Results   Component Value Date    GLUCOSE 110 (H) 02/09/2023    BUN 22 02/09/2023    CREATININE 1.10 02/09/2023    BCR  20.0 02/09/2023    K 4.3 02/09/2023    CO2 24.0 02/09/2023    CALCIUM 9.1 02/09/2023    ALBUMIN 4.3 02/09/2023    AST 19 02/09/2023    ALT 17 02/09/2023     No results found for: WBC, HGB, HCT, MCV, PLT  Lab Results   Component Value Date    CHOL 106 02/09/2023    TRIG 57 02/09/2023    HDL 35 (L) 02/09/2023    LDL 58 02/09/2023     Lab Results   Component Value Date    TSH 2.410 02/09/2023     No results found for: HGBA1C  Lab Results   Component Value Date    ALT 17 02/09/2023     No results found for: HGBA1C  Lab Results   Component Value Date    CREATININE 1.10 02/09/2023     No results found for: IRON, TIBC, FERRITIN  No results found for: INR, PROTIME          ECG 12 Lead    Date/Time: 5/19/2023 10:32 AM  Performed by: Leela Clark APRN  Authorized by: Leela Clark APRN   Comparison: compared with previous ECG   Rhythm: sinus rhythm    Clinical impression: non-specific ECG          --------------------------------------------------------------------------------------------------------------------------    ASSESSMENT/PLAN:      Diagnosis Plan   1. Coronary artery disease involving native coronary artery of native heart without angina pectoris        2. S/P CABG (coronary artery bypass graft)        3. Essential hypertension        4. Paroxysmal atrial fibrillation  apixaban (ELIQUIS) 5 MG tablet tablet      5. MARIA (obstructive sleep apnea)            1. ASCVD  2. Dyslipidemia  • Denies any chest pain.  Echocardiogram showed normal LV function.  Continue with Lipitor, Plavix and metoprolol.  • Recent lipid panel showed LDL at goal.    3. Paroxysmal atrial fibrillation  • Patient has a history of postop atrial fibrillation after having CABG with no reported recurrence.  Given his family history of multiple strokes he has decided to proceed with anticoagulation with Eliquis given his high [id number] score of 5.  At his last visit he was placed on Eliquis however when he went to the  pharmacy his pharmacist was concerned about him being on triple therapy however I did instruct patient to discontinue aspirin and continue Plavix and Eliquis only at that time.  After further discussion patient is agreeable to continue with Plavix and Eliquis only if Eliquis is affordable.  He will reach out her office if he has any questions.    4.  Obstructive sleep apnea  · Unable to tolerate CPAP.    5.  Essential hypertension  · Blood pressure controlled.  Recent CMP showed normal kidney function.  Continue metoprolol.      This document has been @Electronically signed by LEATHA Whitt, 05/19/23, 8:34 AM EDT.       Dictated Utilizing Dragon Dictation: Part of this note may be an electronic transcription/translation of spoken language to printed text using the Dragon Dictation System.    Follow-up appointment and medication changes provided in hand delivered After Visit Summary as well as reviewed in the room.

## 2023-08-30 ENCOUNTER — TRANSCRIBE ORDERS (OUTPATIENT)
Dept: ADMINISTRATIVE | Facility: HOSPITAL | Age: 73
End: 2023-08-30
Payer: MEDICARE

## 2023-08-30 ENCOUNTER — LAB (OUTPATIENT)
Dept: LAB | Facility: HOSPITAL | Age: 73
End: 2023-08-30
Payer: MEDICARE

## 2023-08-30 DIAGNOSIS — Z12.5 PROSTATE CANCER SCREENING: Primary | ICD-10-CM

## 2023-08-30 DIAGNOSIS — Z12.5 PROSTATE CANCER SCREENING: ICD-10-CM

## 2023-08-30 PROCEDURE — 84153 ASSAY OF PSA TOTAL: CPT

## 2023-08-30 PROCEDURE — 36415 COLL VENOUS BLD VENIPUNCTURE: CPT

## 2023-08-31 LAB — PSA SERPL-MCNC: 2.22 NG/ML (ref 0–4)

## 2023-11-13 ENCOUNTER — TELEPHONE (OUTPATIENT)
Dept: CARDIOLOGY | Facility: CLINIC | Age: 73
End: 2023-11-13
Payer: MEDICARE

## 2023-11-13 NOTE — TELEPHONE ENCOUNTER
HUB CAN READ  Called to let pt know that we needed to R/S his appointment, moved to 12/13 @11:30. No answer and vm full.

## 2023-12-13 ENCOUNTER — OFFICE VISIT (OUTPATIENT)
Dept: CARDIOLOGY | Facility: CLINIC | Age: 73
End: 2023-12-13
Payer: MEDICARE

## 2023-12-13 VITALS
RESPIRATION RATE: 18 BRPM | SYSTOLIC BLOOD PRESSURE: 114 MMHG | OXYGEN SATURATION: 98 % | DIASTOLIC BLOOD PRESSURE: 68 MMHG | HEIGHT: 65 IN | WEIGHT: 151 LBS | HEART RATE: 54 BPM | BODY MASS INDEX: 25.16 KG/M2

## 2023-12-13 DIAGNOSIS — I48.0 PAROXYSMAL ATRIAL FIBRILLATION: Primary | ICD-10-CM

## 2023-12-13 DIAGNOSIS — I25.10 CORONARY ARTERY DISEASE INVOLVING NATIVE CORONARY ARTERY OF NATIVE HEART WITHOUT ANGINA PECTORIS: ICD-10-CM

## 2023-12-13 DIAGNOSIS — I10 ESSENTIAL HYPERTENSION: ICD-10-CM

## 2023-12-13 DIAGNOSIS — G47.33 OSA (OBSTRUCTIVE SLEEP APNEA): ICD-10-CM

## 2023-12-13 DIAGNOSIS — Z95.1 S/P CABG (CORONARY ARTERY BYPASS GRAFT): ICD-10-CM

## 2023-12-13 PROCEDURE — 99214 OFFICE O/P EST MOD 30 MIN: CPT | Performed by: NURSE PRACTITIONER

## 2023-12-13 PROCEDURE — 3078F DIAST BP <80 MM HG: CPT | Performed by: NURSE PRACTITIONER

## 2023-12-13 PROCEDURE — 1159F MED LIST DOCD IN RCRD: CPT | Performed by: NURSE PRACTITIONER

## 2023-12-13 PROCEDURE — 3074F SYST BP LT 130 MM HG: CPT | Performed by: NURSE PRACTITIONER

## 2023-12-13 PROCEDURE — 1160F RVW MEDS BY RX/DR IN RCRD: CPT | Performed by: NURSE PRACTITIONER

## 2023-12-13 NOTE — PROGRESS NOTES
[facility]             [facility] LEATHA Clark Jackie D, MD  Valentin Ward  1950  12/13/2023    Patient Active Problem List   Diagnosis    Coronary artery disease involving native coronary artery of native heart without angina pectoris    S/P CABG (coronary artery bypass graft)    Essential hypertension    Dyslipidemia    MARIA (obstructive sleep apnea)    Cigarette nicotine dependence in remission    Paroxysmal atrial fibrillation    Heart murmur       Dear Bethany Hawk MD:    Subjective     HPI:     This is a 73 y.o. male with known past medical history of coronary artery diseae s/p CABG 2V in 2020 (LIMA graft LAD and SVG graft to the obtuse marginal 2), essential hypertension, paroxysmal atrial fibrillation post op, hyperlipidemia and obstructive sleep apnea.     Valentin Ward presents today for routine cardiology follow up.  Patient states he has been doing well since his last visit.  Denies any chest pain, shortness of breath or palpitations.  States he has not been taking his Eliquis this because he was not sure if he wanted to take it or not.  Blood pressure controlled.  Recent lab work stable.    Diagnostic Testing  Cardiac event monitor 12/2022: Sinus tachycardia with sinus bradycardia in the 40s with no arrhythmias       Echocardiogram 3/2023: EF 66 to 70% mild aortic valve stenosis     All other systems were reviewed and were negative.    Patient Active Problem List   Diagnosis    Coronary artery disease involving native coronary artery of native heart without angina pectoris    S/P CABG (coronary artery bypass graft)    Essential hypertension    Dyslipidemia    MARIA (obstructive sleep apnea)    Cigarette nicotine dependence in remission    Paroxysmal atrial fibrillation    Heart murmur       family history is not on file.     reports that he quit smoking about 12 years ago. His smoking use included cigarettes. He smoked an average of .5 packs  per day. He has been exposed to tobacco smoke. He quit smokeless tobacco use about 27 years ago.  His smokeless tobacco use included chew. He reports current alcohol use. He reports that he does not use drugs.    No Known Allergies      Current Outpatient Medications:     apixaban (ELIQUIS) 5 MG tablet tablet, Take 1 tablet by mouth 2 (Two) Times a Day., Disp: 60 tablet, Rfl: 5    atorvastatin (LIPITOR) 80 MG tablet, Take 1 tablet by mouth Daily., Disp: , Rfl:     clopidogrel (PLAVIX) 75 MG tablet, Take 1 tablet by mouth Daily., Disp: , Rfl:     hydrOXYzine pamoate (VISTARIL) 25 MG capsule, Take 1 capsule by mouth 2 (Two) Times a Day., Disp: , Rfl:     Melatonin-Pyridoxine ER 10-10 MG tablet controlled-release, Take  by mouth., Disp: , Rfl:     metFORMIN (GLUCOPHAGE) 500 MG tablet, TAKE 1 TABLET TWICE A DAY BY ORAL ROUTE FOR 90 DAYS., Disp: , Rfl:     metoprolol tartrate (LOPRESSOR) 25 MG tablet, Take 1 tablet by mouth 2 (Two) Times a Day., Disp: , Rfl:     tamsulosin (FLOMAX) 0.4 MG capsule 24 hr capsule, Take 1 capsule by mouth Daily., Disp: , Rfl:       Physical Exam:  I have reviewed the patient's current vital signs as documented in the patient's EMR.   Vitals:    12/13/23 1131   BP: 114/68   Pulse: 54   Resp: 18   SpO2: 98%     Body mass index is 25.13 kg/m².       12/13/23  1131   Weight: 68.5 kg (151 lb)      Physical Exam  Constitutional:       General: He is not in acute distress.     Appearance: Normal appearance. He is well-developed.   HENT:      Head: Normocephalic and atraumatic.      Mouth/Throat:      Mouth: Mucous membranes are moist.   Eyes:      Extraocular Movements: Extraocular movements intact.      Pupils: Pupils are equal, round, and reactive to light.   Neck:      Vascular: No JVD.   Cardiovascular:      Rate and Rhythm: Normal rate and regular rhythm.      Heart sounds: Normal heart sounds. No murmur heard.     No S3 or S4 sounds.   Pulmonary:      Effort: Pulmonary effort is normal. No  "respiratory distress.      Breath sounds: Normal breath sounds. No wheezing.   Abdominal:      General: Bowel sounds are normal. There is no distension.      Palpations: Abdomen is soft. There is no hepatomegaly.      Tenderness: There is no abdominal tenderness.   Musculoskeletal:         General: Normal range of motion.      Cervical back: Normal range of motion and neck supple.   Skin:     General: Skin is warm and dry.      Coloration: Skin is not jaundiced or pale.   Neurological:      General: No focal deficit present.      Mental Status: He is alert and oriented to person, place, and time. Mental status is at baseline.   Psychiatric:         Mood and Affect: Mood normal.         Behavior: Behavior normal.         Thought Content: Thought content normal.         Judgment: Judgment normal.          DATA REVIEWED:     TTE/NANCIE:  Results for orders placed during the hospital encounter of 03/06/23    Adult Transthoracic Echo Complete w/ Color, Spectral and Contrast if necessary per protocol    Interpretation Summary    Left ventricular systolic function is normal. Left ventricular ejection fraction appears to be 66 - 70%.    Left ventricular wall thickness is consistent with hypertrophy. Sigmoid-shaped ventricular septum is present.    Left ventricular diastolic function is consistent with (grade I) impaired relaxation.    Mild aortic valve stenosis is present.    Estimated right ventricular systolic pressure from tricuspid regurgitation is mildly elevated (35-45 mmHg).      Laboratory evaluations:    Lab Results   Component Value Date    GLUCOSE 110 (H) 02/09/2023    BUN 22 02/09/2023    CREATININE 1.10 02/09/2023    BCR 20.0 02/09/2023    K 4.3 02/09/2023    CO2 24.0 02/09/2023    CALCIUM 9.1 02/09/2023    ALBUMIN 4.3 02/09/2023    AST 19 02/09/2023    ALT 17 02/09/2023     No results found for: \"WBC\", \"HGB\", \"HCT\", \"MCV\", \"PLT\"  Lab Results   Component Value Date    CHOL 106 02/09/2023    TRIG 57 02/09/2023    " "HDL 35 (L) 02/09/2023    LDL 58 02/09/2023     Lab Results   Component Value Date    TSH 2.410 02/09/2023     No results found for: \"HGBA1C\"  Lab Results   Component Value Date    ALT 17 02/09/2023     No results found for: \"HGBA1C\"  Lab Results   Component Value Date    CREATININE 1.10 02/09/2023     No results found for: \"IRON\", \"TIBC\", \"FERRITIN\"  No results found for: \"INR\", \"PROTIME\"        --------------------------------------------------------------------------------------------------------------------------    ASSESSMENT/PLAN:      Diagnosis Plan   1. Paroxysmal atrial fibrillation  apixaban (ELIQUIS) 5 MG tablet tablet      2. S/P CABG (coronary artery bypass graft)        3. Coronary artery disease involving native coronary artery of native heart without angina pectoris        4. MARIA (obstructive sleep apnea)        5. Essential hypertension            ASCVD  Dyslipidemia  Denies any chest pain.  Echocardiogram showed normal LV function.  Continue with Lipitor, Plavix and metoprolol.  Recently panel showed LDL at goal.    Paroxysmal atrial fibrillation  Patient has a history of postop atrial fibrillation after having CABG with no reported recurrence.  At his last visit discussion was held regarding continuing anticoagulation versus stopping it given he has had no recurrence of atrial fibrillation after postop CABG.  Patient wanted to continue given he has a family history of strokes.  Today states he has not been taking it as he felt like he was not sure if he wanted to take it given possible bleeding risk.  We discussed again risk and benefit and he has decided to proceed with taking Eliquis given his family history of strokes and he is concerned about this.  I have asked him to contact us if he has any significant bleeding issues with Eliquis.  Currently normal sinus rhythm.    4. MARIA  Unable to tolerate CPAP.    5. Essential hypertension  Blood pressure well-controlled.  Recent lab work stable.  " Continue current management.    This document has been @Electronically signed by LEATHA Whitt, 12/13/23, 11:17 AM EST.       Dictated Utilizing Dragon Dictation: Part of this note may be an electronic transcription/translation of spoken language to printed text using the Dragon Dictation System.    Follow-up appointment and medication changes provided in hand delivered After Visit Summary as well as reviewed in the room.

## 2024-06-13 ENCOUNTER — OFFICE VISIT (OUTPATIENT)
Dept: CARDIOLOGY | Facility: CLINIC | Age: 74
End: 2024-06-13
Payer: MEDICARE

## 2024-06-13 VITALS
HEIGHT: 65 IN | BODY MASS INDEX: 25.52 KG/M2 | WEIGHT: 153.2 LBS | RESPIRATION RATE: 18 BRPM | HEART RATE: 65 BPM | OXYGEN SATURATION: 97 % | SYSTOLIC BLOOD PRESSURE: 128 MMHG | DIASTOLIC BLOOD PRESSURE: 67 MMHG

## 2024-06-13 DIAGNOSIS — Z95.1 S/P CABG (CORONARY ARTERY BYPASS GRAFT): ICD-10-CM

## 2024-06-13 DIAGNOSIS — G47.33 OSA (OBSTRUCTIVE SLEEP APNEA): ICD-10-CM

## 2024-06-13 DIAGNOSIS — I10 ESSENTIAL HYPERTENSION: ICD-10-CM

## 2024-06-13 DIAGNOSIS — I25.10 CORONARY ARTERY DISEASE INVOLVING NATIVE CORONARY ARTERY OF NATIVE HEART WITHOUT ANGINA PECTORIS: ICD-10-CM

## 2024-06-13 DIAGNOSIS — I48.0 PAROXYSMAL ATRIAL FIBRILLATION: Primary | ICD-10-CM

## 2024-06-13 PROCEDURE — 3078F DIAST BP <80 MM HG: CPT | Performed by: NURSE PRACTITIONER

## 2024-06-13 PROCEDURE — 93000 ELECTROCARDIOGRAM COMPLETE: CPT | Performed by: NURSE PRACTITIONER

## 2024-06-13 PROCEDURE — 3074F SYST BP LT 130 MM HG: CPT | Performed by: NURSE PRACTITIONER

## 2024-06-13 PROCEDURE — 1160F RVW MEDS BY RX/DR IN RCRD: CPT | Performed by: NURSE PRACTITIONER

## 2024-06-13 PROCEDURE — 99214 OFFICE O/P EST MOD 30 MIN: CPT | Performed by: NURSE PRACTITIONER

## 2024-06-13 PROCEDURE — 1159F MED LIST DOCD IN RCRD: CPT | Performed by: NURSE PRACTITIONER

## 2024-06-13 NOTE — PROGRESS NOTES
[facility]             [facility] LEATHA Clark Jackie D, MD  Valentin Ward  1950  06/13/2024    Patient Active Problem List   Diagnosis    Coronary artery disease involving native coronary artery of native heart without angina pectoris    S/P CABG (coronary artery bypass graft)    Essential hypertension    Dyslipidemia    MARIA (obstructive sleep apnea)    Cigarette nicotine dependence in remission    Paroxysmal atrial fibrillation    Heart murmur       Dear Bethany Hawk MD:    Subjective     Chief Complaint   Patient presents with    Paroxysmal atrial fibrillation     6 MONTH FU       HPI:     This is a 73 y.o. male with known past medical history of  coronary artery diseae s/p CABG 2V in 2020 (LIMA graft LAD and SVG graft to the obtuse marginal 2), essential hypertension, paroxysmal atrial fibrillation post op, hyperlipidemia and obstructive sleep apnea.     Valentin Ward presents today for routine cardiology follow up.  Patient states he has been doing well since his last visit.  Has been going to the gym and working out on a daily basis with no issues.  Denies any chest pain or shortness of breath.  Recent lab work shows stable kidney function and electrolytes with LDL at goal.  Tolerating Eliquis.    Diagnostic Testing  Cardiac event monitor 12/2022: Sinus tachycardia with sinus bradycardia in the 40s with no arrhythmias       Echocardiogram 3/2023: EF 66 to 70% mild aortic valve stenosis     All other systems were reviewed and were negative.    Patient Active Problem List   Diagnosis    Coronary artery disease involving native coronary artery of native heart without angina pectoris    S/P CABG (coronary artery bypass graft)    Essential hypertension    Dyslipidemia    MARIA (obstructive sleep apnea)    Cigarette nicotine dependence in remission    Paroxysmal atrial fibrillation    Heart murmur       family history is not on file.     reports that he  quit smoking about 13 years ago. His smoking use included cigarettes. He has been exposed to tobacco smoke. He quit smokeless tobacco use about 28 years ago.  His smokeless tobacco use included chew. He reports current alcohol use. He reports that he does not use drugs.    No Known Allergies      Current Outpatient Medications:     apixaban (ELIQUIS) 5 MG tablet tablet, Take 1 tablet by mouth 2 (Two) Times a Day., Disp: 60 tablet, Rfl: 5    atorvastatin (LIPITOR) 80 MG tablet, Take 1 tablet by mouth Daily., Disp: , Rfl:     hydrOXYzine pamoate (VISTARIL) 25 MG capsule, Take 1 capsule by mouth 2 (Two) Times a Day., Disp: , Rfl:     Melatonin-Pyridoxine ER 10-10 MG tablet controlled-release, Take  by mouth., Disp: , Rfl:     metoprolol tartrate (LOPRESSOR) 25 MG tablet, Take 1 tablet by mouth 2 (Two) Times a Day., Disp: , Rfl:     tamsulosin (FLOMAX) 0.4 MG capsule 24 hr capsule, Take 1 capsule by mouth Daily., Disp: , Rfl:     metFORMIN (GLUCOPHAGE) 500 MG tablet, TAKE 1 TABLET TWICE A DAY BY ORAL ROUTE FOR 90 DAYS. (Patient not taking: Reported on 6/13/2024), Disp: , Rfl:       Physical Exam:  I have reviewed the patient's current vital signs as documented in the patient's EMR.   Vitals:    06/13/24 0827   BP: 128/67   Pulse: 65   Resp: 18   SpO2: 97%     Body mass index is 25.49 kg/m².       06/13/24 0827   Weight: 69.5 kg (153 lb 3.2 oz)      Physical Exam  Constitutional:       General: He is not in acute distress.     Appearance: Normal appearance. He is well-developed.   HENT:      Head: Normocephalic and atraumatic.      Mouth/Throat:      Mouth: Mucous membranes are moist.   Eyes:      Extraocular Movements: Extraocular movements intact.      Pupils: Pupils are equal, round, and reactive to light.   Neck:      Vascular: No JVD.   Cardiovascular:      Rate and Rhythm: Normal rate and regular rhythm.      Heart sounds: Normal heart sounds. No murmur heard.     No S3 or S4 sounds.   Pulmonary:      Effort:  "Pulmonary effort is normal. No respiratory distress.      Breath sounds: Normal breath sounds. No wheezing.   Abdominal:      General: Bowel sounds are normal. There is no distension.      Palpations: Abdomen is soft. There is no hepatomegaly.      Tenderness: There is no abdominal tenderness.   Musculoskeletal:         General: Normal range of motion.      Cervical back: Normal range of motion and neck supple.   Skin:     General: Skin is warm and dry.      Coloration: Skin is not jaundiced or pale.   Neurological:      General: No focal deficit present.      Mental Status: He is alert and oriented to person, place, and time. Mental status is at baseline.   Psychiatric:         Mood and Affect: Mood normal.         Behavior: Behavior normal.         Thought Content: Thought content normal.         Judgment: Judgment normal.            DATA REVIEWED:     TTE/NANCIE:  Results for orders placed during the hospital encounter of 03/06/23    Adult Transthoracic Echo Complete w/ Color, Spectral and Contrast if necessary per protocol    Interpretation Summary    Left ventricular systolic function is normal. Left ventricular ejection fraction appears to be 66 - 70%.    Left ventricular wall thickness is consistent with hypertrophy. Sigmoid-shaped ventricular septum is present.    Left ventricular diastolic function is consistent with (grade I) impaired relaxation.    Mild aortic valve stenosis is present.    Estimated right ventricular systolic pressure from tricuspid regurgitation is mildly elevated (35-45 mmHg).      Laboratory evaluations:    Lab Results   Component Value Date    GLUCOSE 110 (H) 02/09/2023    BUN 22 02/09/2023    CREATININE 1.10 02/09/2023    BCR 20.0 02/09/2023    K 4.3 02/09/2023    CO2 24.0 02/09/2023    CALCIUM 9.1 02/09/2023    ALBUMIN 4.3 02/09/2023    AST 19 02/09/2023    ALT 17 02/09/2023     No results found for: \"WBC\", \"HGB\", \"HCT\", \"MCV\", \"PLT\"  Lab Results   Component Value Date    CHOL 106 " "02/09/2023    TRIG 57 02/09/2023    HDL 35 (L) 02/09/2023    LDL 58 02/09/2023     Lab Results   Component Value Date    TSH 2.410 02/09/2023     No results found for: \"HGBA1C\"  Lab Results   Component Value Date    ALT 17 02/09/2023     No results found for: \"HGBA1C\"  Lab Results   Component Value Date    CREATININE 1.10 02/09/2023     No results found for: \"IRON\", \"TIBC\", \"FERRITIN\"  No results found for: \"INR\", \"PROTIME\"        ECG 12 Lead    Date/Time: 6/13/2024 8:59 AM  Performed by: Leela Clark APRN    Authorized by: Leela Clark APRN  Comparison: compared with previous ECG   Rhythm: sinus rhythm  Rate: normal  Other findings: non-specific ST-T wave changes    Clinical impression: non-specific ECG         --------------------------------------------------------------------------------------------------------------------------    ASSESSMENT/PLAN:      Diagnosis Plan   1. Paroxysmal atrial fibrillation  ECG 12 Lead      2. S/P CABG (coronary artery bypass graft)  ECG 12 Lead      3. Coronary artery disease involving native coronary artery of native heart without angina pectoris  ECG 12 Lead      4. MARIA (obstructive sleep apnea)  ECG 12 Lead      5. Essential hypertension  ECG 12 Lead          PAF  Patient has a history of postop atrial fibrillation after having CABG with no reported recurrence. At his last visit discussion was held regarding continuing anticoagulation versus stopping it given he has had no recurrence of atrial fibrillation after postop CABG. Patient wanted to continue given he has a family history of strokes.  Has been doing overall well with Eliquis.  Currently in normal sinus rhythm    ASCVD  Denies any chest pain or shortness of breath.  Recent echocardiogram showed normal LV function.  He goes to the gym and works out on a daily basis with no issues.  Continue with Lipitor and metoprolol.  Recent lab panel showed LDL at goal.    3.  MARIA  Unable to tolerate " CPAP.    5.  Essential hypertension  Blood pressure well-controlled.  Recent lab work stable.  Continue current management.      This document has been @Electronically signed by LEATHA Whitt, 06/13/24, 8:23 AM EDT.       Dictated Utilizing Dragon Dictation: Part of this note may be an electronic transcription/translation of spoken language to printed text using the Dragon Dictation System.    Follow-up appointment and medication changes provided in hand delivered After Visit Summary as well as reviewed in the room.

## 2024-07-03 DIAGNOSIS — I48.0 PAROXYSMAL ATRIAL FIBRILLATION: ICD-10-CM

## 2024-07-03 RX ORDER — APIXABAN 5 MG/1
TABLET, FILM COATED ORAL
Qty: 60 TABLET | Refills: 5 | Status: SHIPPED | OUTPATIENT
Start: 2024-07-03

## 2024-07-19 ENCOUNTER — TRANSCRIBE ORDERS (OUTPATIENT)
Dept: LAB | Facility: HOSPITAL | Age: 74
End: 2024-07-19
Payer: MEDICARE

## 2024-07-19 DIAGNOSIS — R00.1 BRADYCARDIA: Primary | ICD-10-CM

## 2024-07-22 ENCOUNTER — HOSPITAL ENCOUNTER (OUTPATIENT)
Facility: HOSPITAL | Age: 74
Discharge: HOME | End: 2024-07-22
Admitting: FAMILY MEDICINE
Payer: MEDICARE

## 2024-07-22 DIAGNOSIS — R00.1 BRADYCARDIA: ICD-10-CM

## 2024-07-22 LAB
QT INTERVAL: 450 MS
QTC INTERVAL: 434 MS

## 2024-07-22 PROCEDURE — 93005 ELECTROCARDIOGRAM TRACING: CPT | Performed by: FAMILY MEDICINE

## 2024-08-14 ENCOUNTER — OFFICE VISIT (OUTPATIENT)
Dept: CARDIOLOGY | Facility: CLINIC | Age: 74
End: 2024-08-14
Payer: MEDICARE

## 2024-08-14 VITALS
HEIGHT: 65 IN | DIASTOLIC BLOOD PRESSURE: 71 MMHG | OXYGEN SATURATION: 97 % | BODY MASS INDEX: 24.99 KG/M2 | WEIGHT: 150 LBS | SYSTOLIC BLOOD PRESSURE: 115 MMHG | HEART RATE: 57 BPM

## 2024-08-14 DIAGNOSIS — I25.10 CORONARY ARTERY DISEASE INVOLVING NATIVE CORONARY ARTERY OF NATIVE HEART WITHOUT ANGINA PECTORIS: ICD-10-CM

## 2024-08-14 DIAGNOSIS — R00.1 BRADYCARDIA, DRUG INDUCED: ICD-10-CM

## 2024-08-14 DIAGNOSIS — E78.5 DYSLIPIDEMIA: ICD-10-CM

## 2024-08-14 DIAGNOSIS — T50.905A BRADYCARDIA, DRUG INDUCED: ICD-10-CM

## 2024-08-14 DIAGNOSIS — G47.33 OSA (OBSTRUCTIVE SLEEP APNEA): ICD-10-CM

## 2024-08-14 DIAGNOSIS — I48.0 PAROXYSMAL ATRIAL FIBRILLATION: Primary | ICD-10-CM

## 2024-08-14 DIAGNOSIS — I10 ESSENTIAL HYPERTENSION: ICD-10-CM

## 2024-08-14 RX ORDER — ROSUVASTATIN CALCIUM 20 MG/1
20 TABLET, COATED ORAL DAILY
COMMUNITY

## 2024-08-14 NOTE — PROGRESS NOTES
[facility]             [facility] LEATHA Clark Jackie D, MD  Valentin Ward  1950  08/14/2024    Patient Active Problem List   Diagnosis    Coronary artery disease involving native coronary artery of native heart without angina pectoris    S/P CABG (coronary artery bypass graft)    Essential hypertension    Dyslipidemia    MARIA (obstructive sleep apnea)    Cigarette nicotine dependence in remission    Paroxysmal atrial fibrillation    Heart murmur    Bradycardia, drug induced       Dear Bethany Hawk MD:    Subjective     Chief Complaint   Patient presents with    Slow Heart Rate       HPI:     This is a 73 y.o. male with known past medical history of coronary artery diseae s/p CABG 2V in 2020 (LIMA graft LAD and SVG graft to the obtuse marginal 2), essential hypertension, paroxysmal atrial fibrillation post op, hyperlipidemia and obstructive sleep apnea.     Valentin Ward presents today for routine cardiology follow up.  Patient states he was seen by his PCP recently and was found to be bradycardic with heart rates as low as 30s.  His metoprolol was decreased to 12.5 mg and since that time heart rate has been better.  Denies any syncope or dizziness.  His statin was also changed from Lipitor to Crestor due to myalgias which have improved with new medication.  Blood pressure stable.    Diagnostic Testing  Cardiac event monitor 12/2022: Sinus tachycardia with sinus bradycardia in the 40s with no arrhythmias       Echocardiogram 3/2023: EF 66 to 70% mild aortic valve stenosis     All other systems were reviewed and were negative.    Patient Active Problem List   Diagnosis    Coronary artery disease involving native coronary artery of native heart without angina pectoris    S/P CABG (coronary artery bypass graft)    Essential hypertension    Dyslipidemia    MARIA (obstructive sleep apnea)    Cigarette nicotine dependence in remission    Paroxysmal atrial  fibrillation    Heart murmur    Bradycardia, drug induced       family history is not on file.     reports that he quit smoking about 13 years ago. His smoking use included cigarettes. He has been exposed to tobacco smoke. He quit smokeless tobacco use about 28 years ago.  His smokeless tobacco use included chew. He reports current alcohol use. He reports that he does not use drugs.    No Known Allergies      Current Outpatient Medications:     Eliquis 5 MG tablet tablet, TAKE ONE TABLET BY MOUTH TWO TIMES A DAY TO PREVENT CLOTS, Disp: 60 tablet, Rfl: 5    hydrOXYzine pamoate (VISTARIL) 25 MG capsule, Take 1 capsule by mouth 2 (Two) Times a Day., Disp: , Rfl:     Melatonin-Pyridoxine ER 10-10 MG tablet controlled-release, Take  by mouth., Disp: , Rfl:     metoprolol tartrate (LOPRESSOR) 25 MG tablet, Take 0.5 tablets by mouth 2 (Two) Times a Day., Disp: , Rfl:     rosuvastatin (CRESTOR) 20 MG tablet, Take 1 tablet by mouth Daily., Disp: , Rfl:     tamsulosin (FLOMAX) 0.4 MG capsule 24 hr capsule, Take 1 capsule by mouth Daily., Disp: , Rfl:     metFORMIN (GLUCOPHAGE) 500 MG tablet, TAKE 1 TABLET TWICE A DAY BY ORAL ROUTE FOR 90 DAYS. (Patient not taking: Reported on 6/13/2024), Disp: , Rfl:       Physical Exam:  I have reviewed the patient's current vital signs as documented in the patient's EMR.   Vitals:    08/14/24 1046   BP: 115/71   Pulse: 57   SpO2: 97%     Body mass index is 24.96 kg/m².       08/14/24  1046   Weight: 68 kg (150 lb)      Physical Exam  Constitutional:       General: He is not in acute distress.     Appearance: Normal appearance. He is well-developed.   HENT:      Head: Normocephalic and atraumatic.      Mouth/Throat:      Mouth: Mucous membranes are moist.   Eyes:      Extraocular Movements: Extraocular movements intact.      Pupils: Pupils are equal, round, and reactive to light.   Neck:      Vascular: No JVD.   Cardiovascular:      Rate and Rhythm: Normal rate and regular rhythm.  Bradycardia present.      Heart sounds: Normal heart sounds. No murmur heard.     No S3 or S4 sounds.   Pulmonary:      Effort: Pulmonary effort is normal. No respiratory distress.      Breath sounds: Normal breath sounds. No wheezing.   Abdominal:      General: Bowel sounds are normal. There is no distension.      Palpations: Abdomen is soft. There is no hepatomegaly.      Tenderness: There is no abdominal tenderness.   Musculoskeletal:         General: Normal range of motion.      Cervical back: Normal range of motion and neck supple.   Skin:     General: Skin is warm and dry.      Coloration: Skin is not jaundiced or pale.   Neurological:      General: No focal deficit present.      Mental Status: He is alert and oriented to person, place, and time. Mental status is at baseline.   Psychiatric:         Mood and Affect: Mood normal.         Behavior: Behavior normal.         Thought Content: Thought content normal.         Judgment: Judgment normal.            DATA REVIEWED:     TTE/NANCIE:  Results for orders placed during the hospital encounter of 03/06/23    Adult Transthoracic Echo Complete w/ Color, Spectral and Contrast if necessary per protocol    Interpretation Summary    Left ventricular systolic function is normal. Left ventricular ejection fraction appears to be 66 - 70%.    Left ventricular wall thickness is consistent with hypertrophy. Sigmoid-shaped ventricular septum is present.    Left ventricular diastolic function is consistent with (grade I) impaired relaxation.    Mild aortic valve stenosis is present.    Estimated right ventricular systolic pressure from tricuspid regurgitation is mildly elevated (35-45 mmHg).      Laboratory evaluations:    Lab Results   Component Value Date    GLUCOSE 110 (H) 02/09/2023    BUN 22 02/09/2023    CREATININE 1.10 02/09/2023    BCR 20.0 02/09/2023    K 4.3 02/09/2023    CO2 24.0 02/09/2023    CALCIUM 9.1 02/09/2023    ALBUMIN 4.3 02/09/2023    AST 19 02/09/2023     "ALT 17 02/09/2023     No results found for: \"WBC\", \"HGB\", \"HCT\", \"MCV\", \"PLT\"  Lab Results   Component Value Date    CHOL 106 02/09/2023    TRIG 57 02/09/2023    HDL 35 (L) 02/09/2023    LDL 58 02/09/2023     Lab Results   Component Value Date    TSH 2.410 02/09/2023     No results found for: \"HGBA1C\"  Lab Results   Component Value Date    ALT 17 02/09/2023     No results found for: \"HGBA1C\"  Lab Results   Component Value Date    CREATININE 1.10 02/09/2023     No results found for: \"IRON\", \"TIBC\", \"FERRITIN\"  No results found for: \"INR\", \"PROTIME\"        --------------------------------------------------------------------------------------------------------------------------    ASSESSMENT/PLAN:      Diagnosis Plan   1. Paroxysmal atrial fibrillation        2. Coronary artery disease involving native coronary artery of native heart without angina pectoris        3. Essential hypertension        4. MARIA (obstructive sleep apnea)        5. Bradycardia, drug induced        6. Dyslipidemia            PAF  Bradycardia  Patient has a history of postop atrial fibrillation after having CABG with no reported recurrence. At his last visit discussion was held regarding continuing anticoagulation versus stopping it given he has had no recurrence of atrial fibrillation after postop CABG. Patient wanted to continue given he has a family history of strokes.  Has been doing overall well with Eliquis.  Currently in normal sinus rhythm.  Patient followed up with PCP recently and was found to be bradycardic in the 30s and 40s per patient report.  Metoprolol was decreased to 12.5 mg for which I agree with.  Patient currently not symptomatic therefore would not recommend any further workup as heart rate is in the 50s today.  Recent TSH was normal.  Patient is very active and goes to the gym 5 days a week which also could be contributing to lower heart rate    MARIA  Unable to tolerate CPAP    4.  ASCVD  Denies any chest pain shortness of " breath.  Recent echocardiogram showed normal LV function.  Continue medical management    5.  Dyslipidemia  Lipitor was recently switched to Crestor due to myalgias.  Follow-up on lipid panel      This document has been @Electronically signed by LEATHA Whitt, 08/14/24, 10:00 AM EDT.       Dictated Utilizing Dragon Dictation: Part of this note may be an electronic transcription/translation of spoken language to printed text using the Dragon Dictation System.    Follow-up appointment and medication changes provided in hand delivered After Visit Summary as well as reviewed in the room.

## 2024-12-10 DIAGNOSIS — I48.0 PAROXYSMAL ATRIAL FIBRILLATION: ICD-10-CM

## 2024-12-10 RX ORDER — APIXABAN 5 MG/1
TABLET, FILM COATED ORAL
Qty: 180 TABLET | Refills: 1 | Status: SHIPPED | OUTPATIENT
Start: 2024-12-10

## 2025-02-17 ENCOUNTER — OFFICE VISIT (OUTPATIENT)
Dept: CARDIOLOGY | Facility: CLINIC | Age: 75
End: 2025-02-17
Payer: MEDICARE

## 2025-02-17 VITALS
OXYGEN SATURATION: 94 % | DIASTOLIC BLOOD PRESSURE: 64 MMHG | SYSTOLIC BLOOD PRESSURE: 116 MMHG | WEIGHT: 152.4 LBS | HEIGHT: 65 IN | BODY MASS INDEX: 25.39 KG/M2 | HEART RATE: 62 BPM

## 2025-02-17 DIAGNOSIS — I25.10 CORONARY ARTERY DISEASE INVOLVING NATIVE CORONARY ARTERY OF NATIVE HEART WITHOUT ANGINA PECTORIS: Primary | ICD-10-CM

## 2025-02-17 DIAGNOSIS — G47.33 OSA (OBSTRUCTIVE SLEEP APNEA): ICD-10-CM

## 2025-02-17 DIAGNOSIS — I10 ESSENTIAL HYPERTENSION: ICD-10-CM

## 2025-02-17 DIAGNOSIS — I48.0 PAROXYSMAL ATRIAL FIBRILLATION: ICD-10-CM

## 2025-02-17 DIAGNOSIS — Z95.1 S/P CABG (CORONARY ARTERY BYPASS GRAFT): ICD-10-CM

## 2025-02-17 PROCEDURE — 99214 OFFICE O/P EST MOD 30 MIN: CPT | Performed by: NURSE PRACTITIONER

## 2025-02-17 PROCEDURE — 1159F MED LIST DOCD IN RCRD: CPT | Performed by: NURSE PRACTITIONER

## 2025-02-17 PROCEDURE — 1160F RVW MEDS BY RX/DR IN RCRD: CPT | Performed by: NURSE PRACTITIONER

## 2025-02-17 PROCEDURE — 3078F DIAST BP <80 MM HG: CPT | Performed by: NURSE PRACTITIONER

## 2025-02-17 PROCEDURE — 3074F SYST BP LT 130 MM HG: CPT | Performed by: NURSE PRACTITIONER

## 2025-02-17 RX ORDER — AMLODIPINE BESYLATE 5 MG/1
5 TABLET ORAL DAILY
COMMUNITY

## 2025-02-17 RX ORDER — DONEPEZIL HYDROCHLORIDE 5 MG/1
5 TABLET, FILM COATED ORAL NIGHTLY
COMMUNITY

## 2025-02-17 NOTE — PROGRESS NOTES
[facility]             [facility] LEATHA Clark Jackie D, MD  Valentin Ward  1950  02/17/2025    Patient Active Problem List   Diagnosis    Coronary artery disease involving native coronary artery of native heart without angina pectoris    S/P CABG (coronary artery bypass graft)    Essential hypertension    Dyslipidemia    MARIA (obstructive sleep apnea)    Cigarette nicotine dependence in remission    Paroxysmal atrial fibrillation    Heart murmur    Bradycardia, drug induced       Dear Bethany Hawk MD:    Subjective     Chief Complaint   Patient presents with    Follow-up     8 Month Follow up       Med Management     MEDICATION LIST PROVIDED       HPI:     This is a 74 y.o. male with known past medical history of coronary artery diseae s/p CABG 2V in 2020 (LIMA graft LAD and SVG graft to the obtuse marginal 2), essential hypertension, paroxysmal atrial fibrillation post op, hyperlipidemia and obstructive sleep apnea.     Valentin Ward presents today for routine cardiology follow up.   History of Present Illness  He reports an overall good health status with no significant concerns. He maintains a regular gym routine and has experienced weight loss following a period of weight gain. He has been monitoring his heart rate using a Fitbit, which he wears continuously. He has observed that his heart rate tends to decrease during the night, often reaching the mid-40s, with the lowest recorded rate being 43. His daytime resting heart rate typically remains in the mid-60s, although it was 62 this morning.    His blood pressure medication was recently adjusted due to a decrease in his heart rate. He was previously on metoprolol tartrate, which was reduced to 12.5 mg during his last visit in 08/2024. However, he has since been switched to Norvasc 5 mg, which he takes once daily in the evening. He has not noticed any increase in his blood pressure since the  medication change, but he has observed an increase in his heart rate.    He has been diagnosed with sleep apnea but does not use a CPAP machine. He was previously able to detect episodes of atrial fibrillation (AFib) prior to his heart surgery but currently reports no such symptoms.  He had lab work by his PCP recently which showed creatinine of 1.4 otherwise the rest of his labs were unremarkable.  LDL in the 60s.  States he is scheduled to have follow-up lab work.      MEDICATIONS  Current: Norvasc, Eliquis  Discontinued: Metoprolol tartrate       Diagnostic Testing  Cardiac event monitor 12/2022: Sinus tachycardia with sinus bradycardia in the 40s with no arrhythmias       Echocardiogram 3/2023: EF 66 to 70% mild aortic valve stenosis       All other systems were reviewed and were negative.    Patient Active Problem List   Diagnosis    Coronary artery disease involving native coronary artery of native heart without angina pectoris    S/P CABG (coronary artery bypass graft)    Essential hypertension    Dyslipidemia    MARIA (obstructive sleep apnea)    Cigarette nicotine dependence in remission    Paroxysmal atrial fibrillation    Heart murmur    Bradycardia, drug induced       family history is not on file.     reports that he quit smoking about 14 years ago. His smoking use included cigarettes. He has been exposed to tobacco smoke. He quit smokeless tobacco use about 29 years ago.  His smokeless tobacco use included chew. He reports current alcohol use. He reports that he does not use drugs.    No Known Allergies      Current Outpatient Medications:     amLODIPine (NORVASC) 5 MG tablet, Take 1 tablet by mouth Daily., Disp: , Rfl:     apixaban (Eliquis) 5 MG tablet tablet, take one tablet by mouth two times a day to prevent clots, Disp: 180 tablet, Rfl: 1    donepezil (ARICEPT) 5 MG tablet, Take 1 tablet by mouth Every Night., Disp: , Rfl:     hydrOXYzine pamoate (VISTARIL) 25 MG capsule, Take 1 capsule by mouth 2  (Two) Times a Day., Disp: , Rfl:     Melatonin-Pyridoxine ER 10-10 MG tablet controlled-release, Take  by mouth., Disp: , Rfl:     rosuvastatin (CRESTOR) 20 MG tablet, Take 1 tablet by mouth Daily., Disp: , Rfl:     tamsulosin (FLOMAX) 0.4 MG capsule 24 hr capsule, Take 1 capsule by mouth Daily., Disp: , Rfl:     metFORMIN (GLUCOPHAGE) 500 MG tablet, TAKE 1 TABLET TWICE A DAY BY ORAL ROUTE FOR 90 DAYS. (Patient not taking: Reported on 2/17/2025), Disp: , Rfl:       Physical Exam:  I have reviewed the patient's current vital signs as documented in the patient's EMR.   Vitals:    02/17/25 0835   BP: 116/64   Pulse: 62   SpO2: 94%     Body mass index is 25.36 kg/m².       02/17/25  0835   Weight: 69.1 kg (152 lb 6.4 oz)      Physical Exam  Constitutional:       General: He is not in acute distress.     Appearance: Normal appearance. He is well-developed.   HENT:      Head: Normocephalic and atraumatic.      Mouth/Throat:      Mouth: Mucous membranes are moist.   Eyes:      Extraocular Movements: Extraocular movements intact.      Pupils: Pupils are equal, round, and reactive to light.   Neck:      Vascular: No JVD.   Cardiovascular:      Rate and Rhythm: Normal rate and regular rhythm.      Heart sounds: Normal heart sounds. No murmur heard.     No S3 or S4 sounds.   Pulmonary:      Effort: Pulmonary effort is normal. No respiratory distress.      Breath sounds: Normal breath sounds. No wheezing.   Abdominal:      General: Bowel sounds are normal. There is no distension.      Palpations: Abdomen is soft. There is no hepatomegaly.      Tenderness: There is no abdominal tenderness.   Musculoskeletal:         General: Normal range of motion.      Cervical back: Normal range of motion and neck supple.   Skin:     General: Skin is warm and dry.      Coloration: Skin is not jaundiced or pale.   Neurological:      General: No focal deficit present.      Mental Status: He is alert and oriented to person, place, and time.  "Mental status is at baseline.   Psychiatric:         Mood and Affect: Mood normal.         Behavior: Behavior normal.         Thought Content: Thought content normal.         Judgment: Judgment normal.          DATA REVIEWED:     TTE/NANCIE:  Results for orders placed during the hospital encounter of 03/06/23    Adult Transthoracic Echo Complete w/ Color, Spectral and Contrast if necessary per protocol    Interpretation Summary    Left ventricular systolic function is normal. Left ventricular ejection fraction appears to be 66 - 70%.    Left ventricular wall thickness is consistent with hypertrophy. Sigmoid-shaped ventricular septum is present.    Left ventricular diastolic function is consistent with (grade I) impaired relaxation.    Mild aortic valve stenosis is present.    Estimated right ventricular systolic pressure from tricuspid regurgitation is mildly elevated (35-45 mmHg).      Laboratory evaluations:    Lab Results   Component Value Date    GLUCOSE 110 (H) 02/09/2023    BUN 22 02/09/2023    CREATININE 1.10 02/09/2023    BCR 20.0 02/09/2023    K 4.3 02/09/2023    CO2 24.0 02/09/2023    CALCIUM 9.1 02/09/2023    ALBUMIN 4.3 02/09/2023    AST 19 02/09/2023    ALT 17 02/09/2023     No results found for: \"WBC\", \"HGB\", \"HCT\", \"MCV\", \"PLT\"  Lab Results   Component Value Date    CHOL 106 02/09/2023    TRIG 57 02/09/2023    HDL 35 (L) 02/09/2023    LDL 58 02/09/2023     Lab Results   Component Value Date    TSH 2.410 02/09/2023     No results found for: \"HGBA1C\"  Lab Results   Component Value Date    ALT 17 02/09/2023     No results found for: \"HGBA1C\"  Lab Results   Component Value Date    CREATININE 1.10 02/09/2023     No results found for: \"IRON\", \"TIBC\", \"FERRITIN\"  No results found for: \"INR\", \"PROTIME\"   No components found for: \"ABSOLUTELUNG\"    --------------------------------------------------------------------------------------------------------------------------    ASSESSMENT/PLAN:      Diagnosis Plan   1. " Coronary artery disease involving native coronary artery of native heart without angina pectoris        2. Essential hypertension        3. Paroxysmal atrial fibrillation        4. S/P CABG (coronary artery bypass graft)        5. MARIA (obstructive sleep apnea)          Assessment & Plan  1. Bradycardia  2.  PAF  Patient has a history of postop atrial fibrillation after having CABG with no reported recurrence. At his last visit discussion was held regarding continuing anticoagulation versus stopping it given he has had no recurrence of atrial fibrillation after postop CABG. Patient wanted to continue given he has a family history of strokes.  Has been doing overall well with Eliquis.  Currently in normal sinus rhythm.  His heart rate tends to decrease during the night, often reaching the mid-40s, with the lowest recorded rate being 43. His daytime resting heart rate typically remains in the mid-60s. His heart rate is within the normal range, likely due to his overall physical fitness as he goes to the gym multiple times a week. The potential influence of sleep apnea on his heart rate was also discussed. He was reassured that unless he experiences syncope or similar issues, there is no cause for concern regarding his heart rate.    3. Hypertension  His blood pressure is well-controlled at 116/64 mmHg. He is currently taking Norvasc 5 mg once a day in the evening. He was previously on metoprolol, which was discontinued due to its effect on lowering his heart rate. He was advised to continue his current medication regimen.    4. Sleep apnea.  He has been diagnosed with sleep apnea but does not use a CPAP machine. The potential impact of sleep apnea on his heart rate was discussed.    5.  Dyslipidemia  Recently panel showed LDL in the 60s.  Continue statin.    This document has been @Electronically signed by LEATHA Whitt, 02/17/25, 8:38 AM EST.       Dictated Utilizing Dragon Dictation: Part of this note may be  an electronic transcription/translation of spoken language to printed text using the Dragon Dictation System.    Patient or patient representative verbalized consent for the use of Ambient Listening during the visit with  LEATHA Whitt for chart documentation. 2/17/2025  09:04 EST    Follow-up appointment and medication changes provided in hand delivered After Visit Summary as well as reviewed in the room.

## 2025-06-11 DIAGNOSIS — I48.0 PAROXYSMAL ATRIAL FIBRILLATION: ICD-10-CM

## 2025-06-11 RX ORDER — APIXABAN 5 MG/1
TABLET, FILM COATED ORAL
Qty: 180 TABLET | Refills: 0 | Status: SHIPPED | OUTPATIENT
Start: 2025-06-11